# Patient Record
Sex: MALE | Race: WHITE | NOT HISPANIC OR LATINO | Employment: FULL TIME | URBAN - METROPOLITAN AREA
[De-identification: names, ages, dates, MRNs, and addresses within clinical notes are randomized per-mention and may not be internally consistent; named-entity substitution may affect disease eponyms.]

---

## 2023-01-24 PROBLEM — Z95.2 AORTIC VALVE REPLACED: Status: ACTIVE | Noted: 2023-01-24

## 2023-01-24 PROBLEM — G56.00 CARPAL TUNNEL SYNDROME: Status: ACTIVE | Noted: 2023-01-24

## 2023-01-24 PROBLEM — U07.1 COVID-19: Status: ACTIVE | Noted: 2023-01-24

## 2023-01-24 PROBLEM — R00.1 BRADYCARDIA: Status: ACTIVE | Noted: 2023-01-24

## 2023-01-24 PROBLEM — R31.9 HEMATURIA: Status: ACTIVE | Noted: 2023-01-24

## 2023-01-24 PROBLEM — D68.9 COAGULOPATHY (MULTI): Status: ACTIVE | Noted: 2023-01-24

## 2023-01-24 PROBLEM — I10 HTN (HYPERTENSION): Status: ACTIVE | Noted: 2023-01-24

## 2023-01-24 PROBLEM — B02.9 SHINGLES: Status: ACTIVE | Noted: 2023-01-24

## 2023-01-24 PROBLEM — L50.9 HIVES: Status: ACTIVE | Noted: 2023-01-24

## 2023-01-24 PROBLEM — E78.5 HYPERLIPEMIA: Status: ACTIVE | Noted: 2023-01-24

## 2023-01-24 PROBLEM — L20.9 ATOPIC DERMATITIS: Status: ACTIVE | Noted: 2023-01-24

## 2023-01-24 PROBLEM — G43.909 MIGRAINE: Status: ACTIVE | Noted: 2023-01-24

## 2023-01-24 PROBLEM — L30.9 DERMATITIS: Status: ACTIVE | Noted: 2023-01-24

## 2023-01-24 PROBLEM — G57.10 LATERAL FEMORAL CUTANEOUS NEUROPATHY: Status: ACTIVE | Noted: 2023-01-24

## 2023-01-24 PROBLEM — R10.9 FLANK PAIN: Status: ACTIVE | Noted: 2023-01-24

## 2023-01-24 PROBLEM — Z95.2 HISTORY OF ARTIFICIAL HEART VALVE: Status: ACTIVE | Noted: 2023-01-24

## 2023-01-24 PROBLEM — H91.90 HEARING LOSS: Status: ACTIVE | Noted: 2023-01-24

## 2023-01-24 RX ORDER — LISINOPRIL 10 MG/1
1 TABLET ORAL DAILY
COMMUNITY
Start: 2020-12-14 | End: 2023-03-15

## 2023-01-24 RX ORDER — WARFARIN 6 MG/1
1 TABLET ORAL
COMMUNITY
Start: 2018-10-26 | End: 2023-04-04 | Stop reason: SDUPTHER

## 2023-01-24 RX ORDER — BETAMETHASONE DIPROPIONATE 0.5 MG/G
CREAM TOPICAL 2 TIMES DAILY
COMMUNITY
Start: 2019-10-14

## 2023-01-24 RX ORDER — WARFARIN 1 MG/1
1 TABLET ORAL
COMMUNITY
Start: 2019-04-01 | End: 2023-04-04 | Stop reason: SDUPTHER

## 2023-01-24 RX ORDER — CRISABOROLE 20 MG/G
1 OINTMENT TOPICAL 2 TIMES DAILY
COMMUNITY
Start: 2021-09-01

## 2023-03-15 DIAGNOSIS — I10 PRIMARY HYPERTENSION: Primary | ICD-10-CM

## 2023-03-15 RX ORDER — LISINOPRIL 10 MG/1
TABLET ORAL
Qty: 90 TABLET | Refills: 3 | Status: SHIPPED | OUTPATIENT
Start: 2023-03-15 | End: 2023-04-29 | Stop reason: SDUPTHER

## 2023-03-28 ENCOUNTER — OFFICE VISIT (OUTPATIENT)
Dept: PRIMARY CARE | Facility: CLINIC | Age: 53
End: 2023-03-28
Payer: COMMERCIAL

## 2023-03-28 VITALS
RESPIRATION RATE: 14 BRPM | TEMPERATURE: 96.8 F | HEIGHT: 73 IN | OXYGEN SATURATION: 98 % | WEIGHT: 245 LBS | DIASTOLIC BLOOD PRESSURE: 60 MMHG | BODY MASS INDEX: 32.47 KG/M2 | HEART RATE: 70 BPM | SYSTOLIC BLOOD PRESSURE: 130 MMHG

## 2023-03-28 DIAGNOSIS — E78.5 HYPERLIPIDEMIA, UNSPECIFIED HYPERLIPIDEMIA TYPE: ICD-10-CM

## 2023-03-28 DIAGNOSIS — D68.9 COAGULOPATHY (MULTI): ICD-10-CM

## 2023-03-28 DIAGNOSIS — Z95.2 HISTORY OF ARTIFICIAL HEART VALVE: ICD-10-CM

## 2023-03-28 DIAGNOSIS — I10 PRIMARY HYPERTENSION: ICD-10-CM

## 2023-03-28 DIAGNOSIS — Z00.00 PERIODIC HEALTH ASSESSMENT, GENERAL SCREENING, ADULT: Primary | ICD-10-CM

## 2023-03-28 PROBLEM — H91.90 HEARING LOSS: Status: RESOLVED | Noted: 2023-01-24 | Resolved: 2023-03-28

## 2023-03-28 PROBLEM — L20.9 ATOPIC DERMATITIS: Status: RESOLVED | Noted: 2023-01-24 | Resolved: 2023-03-28

## 2023-03-28 PROBLEM — R31.9 HEMATURIA: Status: RESOLVED | Noted: 2023-01-24 | Resolved: 2023-03-28

## 2023-03-28 PROBLEM — R10.9 FLANK PAIN: Status: RESOLVED | Noted: 2023-01-24 | Resolved: 2023-03-28

## 2023-03-28 PROBLEM — G43.909 MIGRAINE: Status: RESOLVED | Noted: 2023-01-24 | Resolved: 2023-03-28

## 2023-03-28 PROBLEM — G57.10 LATERAL FEMORAL CUTANEOUS NEUROPATHY: Status: RESOLVED | Noted: 2023-01-24 | Resolved: 2023-03-28

## 2023-03-28 PROBLEM — L30.9 DERMATITIS: Status: RESOLVED | Noted: 2023-01-24 | Resolved: 2023-03-28

## 2023-03-28 PROBLEM — U07.1 COVID-19: Status: RESOLVED | Noted: 2023-01-24 | Resolved: 2023-03-28

## 2023-03-28 PROBLEM — L50.9 HIVES: Status: RESOLVED | Noted: 2023-01-24 | Resolved: 2023-03-28

## 2023-03-28 PROCEDURE — 99396 PREV VISIT EST AGE 40-64: CPT | Performed by: INTERNAL MEDICINE

## 2023-03-28 PROCEDURE — 1036F TOBACCO NON-USER: CPT | Performed by: INTERNAL MEDICINE

## 2023-03-28 PROCEDURE — 3078F DIAST BP <80 MM HG: CPT | Performed by: INTERNAL MEDICINE

## 2023-03-28 PROCEDURE — 3075F SYST BP GE 130 - 139MM HG: CPT | Performed by: INTERNAL MEDICINE

## 2023-03-28 ASSESSMENT — ENCOUNTER SYMPTOMS
SHORTNESS OF BREATH: 0
CONSTIPATION: 0
NAUSEA: 0
DIARRHEA: 0
COUGH: 0
PALPITATIONS: 0
ABDOMINAL PAIN: 0
WHEEZING: 0

## 2023-03-28 NOTE — PROGRESS NOTES
"Subjective   Patient ID: Josh Chris is a 52 y.o. male who presents for Annual Exam.  Overall doing well.  Patient is fairly active.  Denies any issues with CP,SOB or dizzy spells.  No issues with anxiety, depression or sleep related problems. Denies any issues with HA, numbness or tingling.  No issues or changes with bowel or bladder habits.    ROS is otherwise unremarkable.     Review of Systems   Respiratory:  Negative for cough, shortness of breath and wheezing.    Cardiovascular:  Negative for chest pain, palpitations and leg swelling.   Gastrointestinal:  Negative for abdominal pain, constipation, diarrhea and nausea.       Objective   /60 (BP Location: Left arm, Patient Position: Sitting, BP Cuff Size: Adult)   Pulse 70   Temp 36 °C (96.8 °F) (Tympanic)   Resp 14   Ht 1.854 m (6' 1\")   Wt 111 kg (245 lb)   SpO2 98%   BMI 32.32 kg/m²     Physical Exam  Constitutional:       General: He is not in acute distress.     Appearance: Normal appearance. He is not ill-appearing.   HENT:      Head: Normocephalic and atraumatic.      Nose: Nose normal.   Eyes:      Extraocular Movements: Extraocular movements intact.      Conjunctiva/sclera: Conjunctivae normal.      Pupils: Pupils are equal, round, and reactive to light.   Cardiovascular:      Rate and Rhythm: Normal rate and regular rhythm.      Pulses: Normal pulses.      Heart sounds: Murmur heard.   Pulmonary:      Effort: Pulmonary effort is normal.      Breath sounds: Normal breath sounds.   Abdominal:      General: There is no distension.   Musculoskeletal:         General: Normal range of motion.      Cervical back: Neck supple.   Neurological:      General: No focal deficit present.      Mental Status: He is alert.      Gait: Gait normal.   Psychiatric:         Mood and Affect: Mood normal.         Behavior: Behavior normal.         Assessment/Plan   Problem List Items Addressed This Visit          Circulatory    History of artificial heart " valve    HTN (hypertension)       Hematologic    Coagulopathy (CMS/HCC)       Other    Hyperlipemia     Other Visit Diagnoses       Periodic health assessment, general screening, adult    -  Primary    Relevant Orders    CBC    Comprehensive Metabolic Panel    Lipid Panel    Thyroid Stimulating Hormone        Physical exam is unremarkable.  We reviewed and discussed all the above.  We discussed current medications as well as most recent test results.  He is seeing his cardiologist twice a year now.    We discussed the importance and benefits of a healthy diet that is both low in sugars and low in saturated fats.  We reviewed and discussed the benefits of regular physical exercise especially when at or above a level of 150 minutes/week.  We also discussed the importance of stress management and good sleep hygiene.  We will continue to work on lifestyle improvements and follow-up in 6 to 12 months, sooner if any issues should arise.

## 2023-04-03 ENCOUNTER — LAB (OUTPATIENT)
Dept: LAB | Facility: LAB | Age: 53
End: 2023-04-03
Payer: COMMERCIAL

## 2023-04-03 DIAGNOSIS — Z00.00 PERIODIC HEALTH ASSESSMENT, GENERAL SCREENING, ADULT: ICD-10-CM

## 2023-04-03 LAB
ALANINE AMINOTRANSFERASE (SGPT) (U/L) IN SER/PLAS: 31 U/L (ref 10–52)
ALBUMIN (G/DL) IN SER/PLAS: 4.5 G/DL (ref 3.4–5)
ALKALINE PHOSPHATASE (U/L) IN SER/PLAS: 52 U/L (ref 33–120)
ANION GAP IN SER/PLAS: 11 MMOL/L (ref 10–20)
ASPARTATE AMINOTRANSFERASE (SGOT) (U/L) IN SER/PLAS: 23 U/L (ref 9–39)
BILIRUBIN TOTAL (MG/DL) IN SER/PLAS: 0.8 MG/DL (ref 0–1.2)
CALCIUM (MG/DL) IN SER/PLAS: 9.9 MG/DL (ref 8.6–10.3)
CARBON DIOXIDE, TOTAL (MMOL/L) IN SER/PLAS: 30 MMOL/L (ref 21–32)
CHLORIDE (MMOL/L) IN SER/PLAS: 101 MMOL/L (ref 98–107)
CHOLESTEROL (MG/DL) IN SER/PLAS: 178 MG/DL (ref 0–199)
CHOLESTEROL IN HDL (MG/DL) IN SER/PLAS: 47.5 MG/DL
CHOLESTEROL/HDL RATIO: 3.7
CREATININE (MG/DL) IN SER/PLAS: 1.17 MG/DL (ref 0.5–1.3)
ERYTHROCYTE DISTRIBUTION WIDTH (RATIO) BY AUTOMATED COUNT: 13.2 % (ref 11.5–14.5)
ERYTHROCYTE MEAN CORPUSCULAR HEMOGLOBIN CONCENTRATION (G/DL) BY AUTOMATED: 33 G/DL (ref 32–36)
ERYTHROCYTE MEAN CORPUSCULAR VOLUME (FL) BY AUTOMATED COUNT: 89 FL (ref 80–100)
ERYTHROCYTES (10*6/UL) IN BLOOD BY AUTOMATED COUNT: 4.97 X10E12/L (ref 4.5–5.9)
GFR MALE: 75 ML/MIN/1.73M2
GLUCOSE (MG/DL) IN SER/PLAS: 96 MG/DL (ref 74–99)
HEMATOCRIT (%) IN BLOOD BY AUTOMATED COUNT: 44 % (ref 41–52)
HEMOGLOBIN (G/DL) IN BLOOD: 14.5 G/DL (ref 13.5–17.5)
LDL: 108 MG/DL (ref 0–99)
LEUKOCYTES (10*3/UL) IN BLOOD BY AUTOMATED COUNT: 5.4 X10E9/L (ref 4.4–11.3)
PLATELETS (10*3/UL) IN BLOOD AUTOMATED COUNT: 206 X10E9/L (ref 150–450)
POTASSIUM (MMOL/L) IN SER/PLAS: 5.2 MMOL/L (ref 3.5–5.3)
PROTEIN TOTAL: 6.9 G/DL (ref 6.4–8.2)
SODIUM (MMOL/L) IN SER/PLAS: 137 MMOL/L (ref 136–145)
THYROTROPIN (MIU/L) IN SER/PLAS BY DETECTION LIMIT <= 0.05 MIU/L: 0.73 MIU/L (ref 0.44–3.98)
TRIGLYCERIDE (MG/DL) IN SER/PLAS: 112 MG/DL (ref 0–149)
UREA NITROGEN (MG/DL) IN SER/PLAS: 16 MG/DL (ref 6–23)
VLDL: 22 MG/DL (ref 0–40)

## 2023-04-03 PROCEDURE — 36415 COLL VENOUS BLD VENIPUNCTURE: CPT

## 2023-04-03 PROCEDURE — 85027 COMPLETE CBC AUTOMATED: CPT

## 2023-04-03 PROCEDURE — 84443 ASSAY THYROID STIM HORMONE: CPT

## 2023-04-03 PROCEDURE — 80053 COMPREHEN METABOLIC PANEL: CPT

## 2023-04-03 PROCEDURE — 80061 LIPID PANEL: CPT

## 2023-04-04 DIAGNOSIS — Z95.2 H/O AORTIC VALVE REPLACEMENT: Primary | ICD-10-CM

## 2023-04-04 RX ORDER — WARFARIN 1 MG/1
1 TABLET ORAL EVERY EVENING
Qty: 90 TABLET | Refills: 3 | Status: SHIPPED | OUTPATIENT
Start: 2023-04-04 | End: 2023-07-21

## 2023-04-04 RX ORDER — WARFARIN 6 MG/1
6 TABLET ORAL NIGHTLY
Qty: 30 TABLET | Refills: 3 | Status: SHIPPED | OUTPATIENT
Start: 2023-04-04 | End: 2023-04-29 | Stop reason: SDUPTHER

## 2023-04-29 ENCOUNTER — TELEPHONE (OUTPATIENT)
Dept: PRIMARY CARE | Facility: CLINIC | Age: 53
End: 2023-04-29
Payer: COMMERCIAL

## 2023-04-29 DIAGNOSIS — I10 PRIMARY HYPERTENSION: Primary | ICD-10-CM

## 2023-04-29 DIAGNOSIS — Z95.2 H/O AORTIC VALVE REPLACEMENT: ICD-10-CM

## 2023-04-29 RX ORDER — LISINOPRIL 10 MG/1
10 TABLET ORAL DAILY
Qty: 3 TABLET | Refills: 0 | Status: SHIPPED | OUTPATIENT
Start: 2023-04-29 | End: 2023-07-06 | Stop reason: SDUPTHER

## 2023-04-29 RX ORDER — WARFARIN 6 MG/1
6 TABLET ORAL NIGHTLY
Qty: 3 TABLET | Refills: 0 | Status: SHIPPED | OUTPATIENT
Start: 2023-04-29 | End: 2023-07-21

## 2023-07-05 DIAGNOSIS — I10 PRIMARY HYPERTENSION: ICD-10-CM

## 2023-07-06 DIAGNOSIS — I10 PRIMARY HYPERTENSION: ICD-10-CM

## 2023-07-06 RX ORDER — LISINOPRIL 10 MG/1
10 TABLET ORAL DAILY
Qty: 90 TABLET | Refills: 3 | Status: SHIPPED | OUTPATIENT
Start: 2023-07-06 | End: 2024-07-05

## 2023-07-10 RX ORDER — LISINOPRIL 10 MG/1
10 TABLET ORAL DAILY
Qty: 90 TABLET | Refills: 3 | Status: SHIPPED | OUTPATIENT
Start: 2023-07-10 | End: 2023-08-28 | Stop reason: SDUPTHER

## 2023-07-21 DIAGNOSIS — Z95.2 H/O AORTIC VALVE REPLACEMENT: ICD-10-CM

## 2023-07-21 RX ORDER — WARFARIN 6 MG/1
6 TABLET ORAL NIGHTLY
Qty: 90 TABLET | Refills: 3 | Status: SHIPPED | OUTPATIENT
Start: 2023-07-21 | End: 2024-01-29 | Stop reason: SDUPTHER

## 2023-07-21 RX ORDER — WARFARIN 1 MG/1
1 TABLET ORAL EVERY EVENING
Qty: 90 TABLET | Refills: 3 | Status: SHIPPED | OUTPATIENT
Start: 2023-07-21 | End: 2024-01-29 | Stop reason: SDUPTHER

## 2023-08-28 ENCOUNTER — OFFICE VISIT (OUTPATIENT)
Dept: PRIMARY CARE | Facility: CLINIC | Age: 53
End: 2023-08-28
Payer: COMMERCIAL

## 2023-08-28 VITALS
TEMPERATURE: 96.9 F | OXYGEN SATURATION: 98 % | WEIGHT: 240 LBS | SYSTOLIC BLOOD PRESSURE: 132 MMHG | DIASTOLIC BLOOD PRESSURE: 76 MMHG | BODY MASS INDEX: 31.66 KG/M2 | HEART RATE: 53 BPM | RESPIRATION RATE: 16 BRPM

## 2023-08-28 DIAGNOSIS — Z79.01 CHRONIC ANTICOAGULATION: ICD-10-CM

## 2023-08-28 DIAGNOSIS — S70.11XA HEMATOMA OF RIGHT THIGH, INITIAL ENCOUNTER: Primary | ICD-10-CM

## 2023-08-28 PROCEDURE — 3078F DIAST BP <80 MM HG: CPT | Performed by: INTERNAL MEDICINE

## 2023-08-28 PROCEDURE — 99213 OFFICE O/P EST LOW 20 MIN: CPT | Performed by: INTERNAL MEDICINE

## 2023-08-28 PROCEDURE — 1036F TOBACCO NON-USER: CPT | Performed by: INTERNAL MEDICINE

## 2023-08-28 PROCEDURE — 3075F SYST BP GE 130 - 139MM HG: CPT | Performed by: INTERNAL MEDICINE

## 2023-08-28 ASSESSMENT — ENCOUNTER SYMPTOMS: LEG PAIN: 1

## 2023-08-28 NOTE — PROGRESS NOTES
Subjective   Patient ID: Josh Chris is a 53 y.o. male who presents for Leg Injury.    Leg Pain   Incident onset: Saturday. The injury mechanism was a direct blow. The pain is present in the right thigh. Associated symptoms comments: Large bruise  .   He was moving a table and the leg of the table hit into his upper inner thigh.  This occurred 2-3 days ago.  He has a lump in the area and surrounding bruising.  The lump has not gotten bigger over the past 24 or more hours.      Review of Systems    Objective   /76   Pulse 53   Temp 36.1 °C (96.9 °F)   Resp 16   Wt 109 kg (240 lb)   SpO2 98%   BMI 31.66 kg/m²     Physical Exam  Musculoskeletal:         General: Swelling and tenderness present.   Skin:     General: Skin is warm and dry.      Findings: Bruising present.     Hematoma at inner thigh with surrounding bruising.  The hematoma has not gotten bigger.      Assessment/Plan   Problem List Items Addressed This Visit    None  Visit Diagnoses       Hematoma of right thigh, initial encounter    -  Primary    Chronic anticoagulation            Hematoma with bruising.  Stable.  He is on coumadin but no signs of active bleeding.  He had been using ice.  Now that bleeding has stopped we will have him apply warm compresses to help absorb hematoma.  If any increase in size he is to return to office.

## 2024-01-29 ENCOUNTER — OFFICE VISIT (OUTPATIENT)
Dept: PRIMARY CARE | Facility: CLINIC | Age: 54
End: 2024-01-29
Payer: COMMERCIAL

## 2024-01-29 VITALS
RESPIRATION RATE: 14 BRPM | BODY MASS INDEX: 31.94 KG/M2 | WEIGHT: 241 LBS | SYSTOLIC BLOOD PRESSURE: 138 MMHG | DIASTOLIC BLOOD PRESSURE: 80 MMHG | TEMPERATURE: 97.4 F | HEART RATE: 53 BPM | OXYGEN SATURATION: 99 % | HEIGHT: 73 IN

## 2024-01-29 DIAGNOSIS — I10 PRIMARY HYPERTENSION: ICD-10-CM

## 2024-01-29 DIAGNOSIS — G89.29 CHRONIC RIGHT SHOULDER PAIN: ICD-10-CM

## 2024-01-29 DIAGNOSIS — M24.811 INTERNAL DERANGEMENT OF RIGHT SHOULDER: Primary | ICD-10-CM

## 2024-01-29 DIAGNOSIS — D68.9 COAGULOPATHY (MULTI): ICD-10-CM

## 2024-01-29 DIAGNOSIS — M25.511 CHRONIC RIGHT SHOULDER PAIN: ICD-10-CM

## 2024-01-29 DIAGNOSIS — Z95.2 H/O AORTIC VALVE REPLACEMENT: ICD-10-CM

## 2024-01-29 PROCEDURE — 3079F DIAST BP 80-89 MM HG: CPT | Performed by: INTERNAL MEDICINE

## 2024-01-29 PROCEDURE — 1036F TOBACCO NON-USER: CPT | Performed by: INTERNAL MEDICINE

## 2024-01-29 PROCEDURE — 3075F SYST BP GE 130 - 139MM HG: CPT | Performed by: INTERNAL MEDICINE

## 2024-01-29 PROCEDURE — 99213 OFFICE O/P EST LOW 20 MIN: CPT | Performed by: INTERNAL MEDICINE

## 2024-01-29 RX ORDER — WARFARIN 1 MG/1
1 TABLET ORAL EVERY EVENING
Qty: 90 TABLET | Refills: 3 | Status: SHIPPED | OUTPATIENT
Start: 2024-01-29 | End: 2025-01-28

## 2024-01-29 RX ORDER — WARFARIN 6 MG/1
6 TABLET ORAL NIGHTLY
Qty: 90 TABLET | Refills: 3 | Status: SHIPPED | OUTPATIENT
Start: 2024-01-29 | End: 2025-01-28

## 2024-01-29 ASSESSMENT — ENCOUNTER SYMPTOMS
WHEEZING: 0
DIARRHEA: 0
CONSTIPATION: 0
SHORTNESS OF BREATH: 0
ARTHRALGIAS: 1
PALPITATIONS: 0
COUGH: 0
ABDOMINAL PAIN: 0

## 2024-01-29 NOTE — PROGRESS NOTES
"Subjective   Patient ID: Josh Chris is a 53 y.o. male who presents for Pain ( Rt Shoulder pain x 3 months.).    Fell back in October.  Shoulder slammed into stair banister.    ROM has improved with PT, but still painful.   Only in certain positions.  No CP.  No SOB.    No swelling.        Review of Systems   Respiratory:  Negative for cough, shortness of breath and wheezing.    Cardiovascular:  Negative for chest pain and palpitations.   Gastrointestinal:  Negative for abdominal pain, constipation and diarrhea.   Musculoskeletal:  Positive for arthralgias.        Chronic shoulder pain.       Objective   /80 (BP Location: Left arm, Patient Position: Sitting, BP Cuff Size: Adult)   Pulse 53   Temp 36.3 °C (97.4 °F) (Tympanic)   Resp 14   Ht 1.854 m (6' 1\")   Wt 109 kg (241 lb)   SpO2 99%   BMI 31.80 kg/m²     Physical Exam  Vitals reviewed.   Constitutional:       Appearance: Normal appearance.   HENT:      Head: Normocephalic.   Cardiovascular:      Rate and Rhythm: Normal rate and regular rhythm.   Pulmonary:      Effort: Pulmonary effort is normal.      Breath sounds: Normal breath sounds.   Musculoskeletal:         General: Normal range of motion.   Neurological:      General: No focal deficit present.      Mental Status: He is alert.   Psychiatric:         Mood and Affect: Mood normal.       Assessment/Plan   Problem List Items Addressed This Visit             ICD-10-CM    Coagulopathy (CMS/HCC) D68.9    Relevant Medications    warfarin (Coumadin) 1 mg tablet    warfarin (Coumadin) 6 mg tablet    Primary hypertension I10    Chronic right shoulder pain M25.511, G89.29    Relevant Orders    MR shoulder right wo IV contrast     Other Visit Diagnoses         Codes    Internal derangement of right shoulder    -  Primary M24.811    Relevant Orders    MR shoulder right wo IV contrast    H/O aortic valve replacement     Z95.2    Relevant Medications    warfarin (Coumadin) 1 mg tablet    warfarin " (Coumadin) 6 mg tablet        Right shoulder pain.  Seems consistent with injured   Recent INR is 3.3- chronic issues are stable.    Due to INR we will treat with topical Voltaren and PT.    It symptoms do not improve we will have him stop his coumadin for 2 days and come in for shot then resume his coumading the following day.

## 2024-02-12 ENCOUNTER — APPOINTMENT (OUTPATIENT)
Dept: RADIOLOGY | Facility: CLINIC | Age: 54
End: 2024-02-12
Payer: COMMERCIAL

## 2024-02-20 ENCOUNTER — APPOINTMENT (OUTPATIENT)
Dept: RADIOLOGY | Facility: CLINIC | Age: 54
End: 2024-02-20
Payer: COMMERCIAL

## 2024-03-08 ENCOUNTER — APPOINTMENT (OUTPATIENT)
Dept: RADIOLOGY | Facility: CLINIC | Age: 54
End: 2024-03-08
Payer: COMMERCIAL

## 2024-03-28 ENCOUNTER — OFFICE VISIT (OUTPATIENT)
Dept: PRIMARY CARE | Facility: CLINIC | Age: 54
End: 2024-03-28
Payer: COMMERCIAL

## 2024-03-28 VITALS
DIASTOLIC BLOOD PRESSURE: 80 MMHG | SYSTOLIC BLOOD PRESSURE: 140 MMHG | BODY MASS INDEX: 31.68 KG/M2 | RESPIRATION RATE: 14 BRPM | HEIGHT: 73 IN | OXYGEN SATURATION: 98 % | WEIGHT: 239 LBS | TEMPERATURE: 97 F | HEART RATE: 60 BPM

## 2024-03-28 DIAGNOSIS — M54.10 BACK PAIN WITH RADICULOPATHY: Primary | ICD-10-CM

## 2024-03-28 PROCEDURE — 99213 OFFICE O/P EST LOW 20 MIN: CPT | Performed by: INTERNAL MEDICINE

## 2024-03-28 PROCEDURE — 1036F TOBACCO NON-USER: CPT | Performed by: INTERNAL MEDICINE

## 2024-03-28 PROCEDURE — 3077F SYST BP >= 140 MM HG: CPT | Performed by: INTERNAL MEDICINE

## 2024-03-28 PROCEDURE — 3079F DIAST BP 80-89 MM HG: CPT | Performed by: INTERNAL MEDICINE

## 2024-03-28 RX ORDER — CYCLOBENZAPRINE HCL 5 MG
5 TABLET ORAL NIGHTLY PRN
Qty: 14 TABLET | Refills: 0 | Status: SHIPPED | OUTPATIENT
Start: 2024-03-28 | End: 2024-04-11

## 2024-03-28 RX ORDER — PREDNISONE 10 MG/1
10 TABLET ORAL SEE ADMIN INSTRUCTIONS
Qty: 21 TABLET | Refills: 0 | Status: SHIPPED | OUTPATIENT
Start: 2024-03-28 | End: 2024-04-11

## 2024-03-28 ASSESSMENT — ENCOUNTER SYMPTOMS
ABDOMINAL PAIN: 0
COUGH: 0
DIARRHEA: 0
PALPITATIONS: 0
NAUSEA: 0
BACK PAIN: 1
WHEEZING: 0
SHORTNESS OF BREATH: 0
CONSTIPATION: 0

## 2024-03-28 NOTE — PROGRESS NOTES
"Subjective   Patient ID: Josh Chris is a 53 y.o. male who presents for Back Pain.    Pain with maybe some red skin on his back.  Started about 2 weeks ago.  Between his shoulder blades in the middle of his back.  Some shooting pain to the left.  Bothersome at night when laying down.  He says this is a common area for him to have discomfort, but the the pain to the left is different and not going away.    No CP, SOB or dizzy spells.   He is able to work out with issues.     Review of Systems    Objective   /80 (BP Location: Left arm, Patient Position: Sitting, BP Cuff Size: Adult)   Pulse 60   Temp 36.1 °C (97 °F) (Tympanic)   Resp 14   Ht 1.854 m (6' 1\")   Wt 108 kg (239 lb)   SpO2 98%   BMI 31.53 kg/m²     Physical Exam  Vitals reviewed.   Constitutional:       Appearance: Normal appearance.   HENT:      Head: Normocephalic.   Cardiovascular:      Rate and Rhythm: Normal rate.   Pulmonary:      Effort: Pulmonary effort is normal.   Musculoskeletal:         General: Normal range of motion.   Skin:     General: Skin is warm and dry.      Findings: No erythema or rash.   Neurological:      General: No focal deficit present.      Mental Status: He is alert.   Psychiatric:         Mood and Affect: Mood normal.         Assessment/Plan   Problem List Items Addressed This Visit    None  Visit Diagnoses         Codes    Back pain with radiculopathy    -  Primary M54.10    Relevant Medications    predniSONE (Deltasone) 10 mg tablet    cyclobenzaprine (Flexeril) 5 mg tablet          Suspect neuro foraminal stenosis from arthritis, less likely a disc.  Due to his blood thinner we will start with prednisone and muscle relaxer to help with sleep.  Tylenol is ok.  If symptoms persist we may need to consider an MRI or pain management.       "

## 2024-03-28 NOTE — PROGRESS NOTES
"Subjective   Patient ID: Josh Chris is a 53 y.o. male who presents for Back Pain.    Back Pain  Pertinent negatives include no abdominal pain or chest pain.        Review of Systems   Respiratory:  Negative for cough, shortness of breath and wheezing.    Cardiovascular:  Negative for chest pain and palpitations.   Gastrointestinal:  Negative for abdominal pain, constipation, diarrhea and nausea.   Musculoskeletal:  Positive for back pain.       Objective   /80 (BP Location: Left arm, Patient Position: Sitting, BP Cuff Size: Adult)   Pulse 60   Temp 36.1 °C (97 °F) (Tympanic)   Resp 14   Ht 1.854 m (6' 1\")   Wt 108 kg (239 lb)   SpO2 98%   BMI 31.53 kg/m²     Physical Exam    Assessment/Plan          "

## 2024-06-12 DIAGNOSIS — I10 PRIMARY HYPERTENSION: ICD-10-CM

## 2024-06-12 RX ORDER — LISINOPRIL 10 MG/1
10 TABLET ORAL DAILY
Qty: 90 TABLET | Refills: 3 | Status: SHIPPED | OUTPATIENT
Start: 2024-06-12

## 2025-06-26 ENCOUNTER — TELEPHONE (OUTPATIENT)
Dept: PRIMARY CARE | Facility: CLINIC | Age: 55
End: 2025-06-26
Payer: COMMERCIAL

## 2025-06-26 NOTE — TELEPHONE ENCOUNTER
Collette BENJAMIN/Jeanne  Patient has not reported INR since February.   They will close account d/t noncompliance    [phone]

## 2025-08-06 DIAGNOSIS — Z12.11 SCREENING FOR COLORECTAL CANCER: ICD-10-CM

## 2025-08-06 DIAGNOSIS — Z12.12 SCREENING FOR COLORECTAL CANCER: ICD-10-CM
